# Patient Record
Sex: FEMALE | Race: WHITE | NOT HISPANIC OR LATINO | URBAN - METROPOLITAN AREA
[De-identification: names, ages, dates, MRNs, and addresses within clinical notes are randomized per-mention and may not be internally consistent; named-entity substitution may affect disease eponyms.]

---

## 2021-09-07 ENCOUNTER — EMERGENCY (EMERGENCY)
Facility: HOSPITAL | Age: 61
LOS: 1 days | Discharge: HOME | End: 2021-09-07
Attending: EMERGENCY MEDICINE
Payer: COMMERCIAL

## 2021-09-07 VITALS
HEART RATE: 79 BPM | DIASTOLIC BLOOD PRESSURE: 84 MMHG | SYSTOLIC BLOOD PRESSURE: 135 MMHG | OXYGEN SATURATION: 99 % | TEMPERATURE: 99 F | RESPIRATION RATE: 18 BRPM

## 2021-09-07 VITALS
TEMPERATURE: 99 F | OXYGEN SATURATION: 98 % | HEART RATE: 84 BPM | WEIGHT: 149.91 LBS | HEIGHT: 63 IN | RESPIRATION RATE: 20 BRPM | DIASTOLIC BLOOD PRESSURE: 89 MMHG | SYSTOLIC BLOOD PRESSURE: 128 MMHG

## 2021-09-07 LAB
RAPID RVP RESULT: DETECTED
SARS-COV-2 RNA SPEC QL NAA+PROBE: DETECTED

## 2021-09-07 PROCEDURE — 71045 X-RAY EXAM CHEST 1 VIEW: CPT | Mod: 26

## 2021-09-07 PROCEDURE — 71045 X-RAY EXAM CHEST 1 VIEW: CPT

## 2021-09-07 PROCEDURE — 99284 EMERGENCY DEPT VISIT MOD MDM: CPT

## 2021-09-07 PROCEDURE — 0225U NFCT DS DNA&RNA 21 SARSCOV2: CPT

## 2021-09-07 PROCEDURE — 99284 EMERGENCY DEPT VISIT MOD MDM: CPT | Mod: 25

## 2021-09-07 NOTE — ED PROVIDER NOTE - NS ED ROS FT
Gen: Denies fevers/chills  CV: Denies chest pain, palpitations  Skin: Denies rash, erythema, color changes  Endo: Denies sensitivity to heat, cold, increased urination  GI: Denies diarrhea, constipation, nausea, vomiting  Msk: Denies back pain, LE swelling, extremity pain  : Denies dysuria, increased frequency  Neuro: Denies LOC, weakness, numbness/tingling

## 2021-09-07 NOTE — ED PROVIDER NOTE - OBJECTIVE STATEMENT
61 y/o female with pmhx of asthma presenting with cough and loss of taste/smell x 4 days. Granddaughter who patient was exposed to recently tested + for COVID. Patient's symptoms started with body aches on Saturday morning with non-productive cough developing at night. On Sunday patient started to develop loss of taste/smell. Denies fevers/chills, n/v/d, cough, rashes or changes in urination. Vaccinated against COVID pzifer x 2. Reports mild SOB but denies chest pain, palpitations, leg swelling, hemoptysis, recent bed bound nature or hormone therapy.

## 2021-09-07 NOTE — ED PROVIDER NOTE - CLINICAL SUMMARY MEDICAL DECISION MAKING FREE TEXT BOX
61 y/o female with pmhx of asthma presenting with cough and loss of taste/smell x 4 days, SPO2 100% on RA with no desaturations, appears well. CXR, RVP and d/c with return precautions for symptoms likely related to COVID given recent exposure.

## 2021-09-07 NOTE — ED PROVIDER NOTE - CARE PLAN
Principal Discharge DX:	Loss of taste  Secondary Diagnosis:	Cough   1 Principal Discharge DX:	2019 novel coronavirus disease (COVID-19)  Secondary Diagnosis:	Cough

## 2021-09-07 NOTE — ED PROVIDER NOTE - PATIENT PORTAL LINK FT
You can access the FollowMyHealth Patient Portal offered by [facility] by registering at the following website: [email or web address]. By joining [facility]’s FollowMyHealth portal, you will also be able to view your health information using other applications (apps) compatible with our system.

## 2021-09-07 NOTE — ED PROVIDER NOTE - NSICDXPASTMEDICALHX_GEN_ALL_CORE_FT
PAST MEDICAL HISTORY:  Asthma     COVID-19 vaccine series completed     H/O basal cell carcinoma excision     Scoliosis

## 2021-09-07 NOTE — ED PROVIDER NOTE - NSFOLLOWUPINSTRUCTIONS_ED_ALL_ED_FT
You have tested POSITIVE for the novel coronavirus (COVID-19). Upon discharge, you must self-quarantine for 14 days, or until the Department of Health contacts you. Please wear a face mask if you are around other individuals. Try to avoid contact with house members, family, and friends for the duration of this quarantine. Please follow up with your primary care physician within 2-3 weeks of your discharge from the hospital. Please take all medications as prescribed. If you experience any worsening or recurrence of your symptoms, particularly worsening or high fever, shortness of breathe, extreme fatigue, or bloody cough please call [phone] immediately or report to the nearest Emergency Department. If you have any questions or concerns, please do not hesitate to call the hospital at [phone].

## 2021-09-07 NOTE — ED PROVIDER NOTE - PHYSICAL EXAMINATION
Gen: WDWN, NAD, no acute respiratory distress   HEENT: PERRLA, EOMI, no nasal discharge, mucous membranes moist, no oropharyngeal edema/erythema/exudates   CV: RRR, +S1/S2, no M/R/G  Resp: CTAB, no W/R/R, SPO2 100% on RA with no desaturations   GI: Abdomen soft non-distended, NTTP, no masses  MSK: No open wounds, no bruising, no LE edema and no calf tenderness   Neuro: A&Ox4, following commands, moving all four extremities spontaneously  Psych: appropriate mood Gen: WDWN, NAD, no acute respiratory distress   HEENT: PERRLA, EOMI, no nasal discharge, mucous membranes moist, no oropharyngeal edema/erythema/exudates   CV: RRR, +S1/S2, no M/R/G  Resp: CTAB, no W/R/R, SPO2 100% on RA with no desaturations, no increased WOB    GI: Abdomen soft non-distended, NTTP, no masses  MSK: No open wounds, no bruising, no LE edema and no calf tenderness   Neuro: A&Ox4, following commands, moving all four extremities spontaneously  Psych: appropriate mood

## 2021-09-07 NOTE — ED PROVIDER NOTE - ATTENDING CONTRIBUTION TO CARE
Private Physician Lauren Robles PCP/Marco Antonio  60y female pmh asthma. Scolosis sp surg, Skin ca Sp yoel , No dm,habits,cancer,cva, coronary artery disease, Private Physician Lauren Robles PCP/Marco Antonio  60y female pmh asthma. Scolosis sp surg, Skin ca Sp yoel , No dm,habits,cancer,cva, coronary artery disease, Pt comes to ed c/o cough shortness of breath loss of taste for past 4d. Close contact to [facility] who tested covid positive. Pt has used nebs w mod relief Private Physician Lauren Robles PCP/Marco Antonio  60y female pmh asthma. Scolosis sp surg, Skin ca Sp yoel , No dm,habits,cancer,cva, coronary artery disease, Pt comes to ed c/o cough shortness of breath loss of taste for past 4d. Close contact to [facility] who tested covid positive. Pt has used nebs w mod relief. PE WDWN Female w mild cough Heent Ncat neck supple chest clear anterior & posterior cv no rubs, gallops or murmurs abd soft Neuro no focal defects  Alec Fleming MD, Facep

## 2021-09-07 NOTE — ED PROVIDER NOTE - PROGRESS NOTE DETAILS
Chest xray shows clear lungs. COVID PCR positive. O2sat 98% on room air. No hypoxia or SOB. Will discharge with COVID instructions.

## 2021-09-07 NOTE — ED ADULT NURSE NOTE - OBJECTIVE STATEMENT
60y female arrived to ED complaining of loss of taste and smell. Patient PMHx asthma. Patient reports recently being exposed to RSV while babysitting her sick grandchild. Patient endorses several days of productive cough and ARMENDARIZ. Denies CP, n/v/d, abd pain, chills, fever. Patient is fully vaccinated w/ Pfizer. Patient A&Ox4, ambulatory, VS stable.

## 2021-09-07 NOTE — ED ADULT TRIAGE NOTE - CHIEF COMPLAINT QUOTE
loss of taste and smell x2 days, thinks I have covid    of note, reports hanging out with grandson with RSV last month

## 2023-09-29 NOTE — ED ADULT TRIAGE NOTE - ACCOMPANIED BY
----- Message from Mateusz Godoy DO sent at 9/29/2023  9:19 AM EDT -----  Recall colon for 5years
Placed colonoscopy recall for 5 years
Self